# Patient Record
Sex: FEMALE | URBAN - METROPOLITAN AREA
[De-identification: names, ages, dates, MRNs, and addresses within clinical notes are randomized per-mention and may not be internally consistent; named-entity substitution may affect disease eponyms.]

---

## 2019-07-19 ENCOUNTER — APPOINTMENT (RX ONLY)
Dept: URBAN - METROPOLITAN AREA CLINIC 117 | Facility: CLINIC | Age: 54
Setting detail: DERMATOLOGY
End: 2019-07-19

## 2019-07-19 DIAGNOSIS — Z41.1 ENCOUNTER FOR COSMETIC SURGERY: ICD-10-CM

## 2019-07-19 PROCEDURE — ? COSMETIC QUOTE

## 2019-07-19 PROCEDURE — ? CONSULTATION - ABDOMINOPLASTY

## 2019-07-19 PROCEDURE — ? CONSULTATION - LIPOSUCTION

## 2019-07-19 ASSESSMENT — LOCATION SIMPLE DESCRIPTION DERM
LOCATION SIMPLE: RIGHT THIGH
LOCATION SIMPLE: ABDOMEN
LOCATION SIMPLE: LEFT THIGH

## 2019-07-19 ASSESSMENT — LOCATION DETAILED DESCRIPTION DERM
LOCATION DETAILED: RIGHT ANTERIOR PROXIMAL THIGH
LOCATION DETAILED: PERIUMBILICAL SKIN
LOCATION DETAILED: LEFT ANTERIOR LATERAL PROXIMAL THIGH

## 2019-07-19 ASSESSMENT — LOCATION ZONE DERM
LOCATION ZONE: LEG
LOCATION ZONE: TRUNK

## 2019-07-19 NOTE — HPI: ABDOMEN (ABDOMINOPLASTY CONSULTATION)
Is This A New Presentation, Or A Follow-Up?: Abdominal Aesthetic Deformity
How Severe Is It?: mild
Garment: dress size
Weight Measured In Kgs Or Lbs?: lbs
What Is Your Size?: 8
How Much Weight (In Lbs.) Have You Gained?: 7-10
What Is Your Current Weight?: 150lb
Additional History: Patient would like a quote for liposuction as well.  Patient states she had breast reconstruction and would like to discuss fat transfer to her breast.

## 2019-07-19 NOTE — PROCEDURE: COSMETIC QUOTE
Face Procedure 19 Percentage Discount: 0
Misc 3 Free Text Discount (In Dollars- Use Only Numbers And Decimals): 0.00
Breast Procedure 2 Price/Unit (In Dollars- Use Only Numbers And Decimals): [employer]
Facility 1 Price (In Dollars- Use Only Numbers And Decimals): [address] [facility],[address]
Face Procedure 2: Upper/lower Blepharoplasty
Face Procedure 1: Face Lift
Facility Fee Units (Optional): 1
Body Procedure 4: Liposuction of thighs
Body Procedure 2: Abdominoplasty with Liposuction
Detail Level: Zone
Breast Procedure 1: Breast Augmentation-Silicone
Implant 3 Price/Unit (In Dollars- Use Only Numbers And Decimals): [address] [facility]
Facility 1: [facility]
Quote Title (Optional- Will Act As A Header): Liposuction of thigh
Face Procedure 3: Upper Blephroplasty
Implant 4 Price/Unit (In Dollars- Use Only Numbers And Decimals): [address]
Facility 2 Price (In Dollars- Use Only Numbers And Decimals): 600
Facility Fee Amount Override (Use Numbers Only): [address]
Include Sales Tax: No
Body Procedure 3: Liposuction of abdomen and flanks
Implant 1 Price/Unit (In Dollars- Use Only Numbers And Decimals): [address]
Breast Procedure 7: Breast Reduction
Breast Procedure 6: Breast Mastopexy
Body Procedure 1: Abdominoplasty
Facility 2: [facility]
Implant 3 Price/Unit (In Dollars- Use Only Numbers And Decimals): 1870
Facility 2: [facility]
Breast Procedure 2: Breast Augmentation-Saline
Anesthesia Fee Amount Override (Use Numbers Only): [facility]
Anesthesia 1: per hour
Breast Procedure 1 Price/Unit (In Dollars- Use Only Numbers And Decimals): 7898
Breast Procedure 3: Breast Implant Exchange
Implant 1 Price/Unit (In Dollars- Use Only Numbers And Decimals): 550
Implant 4: Silicone cohesive
Body Procedure 4 Price/Unit (In Dollars- Use Only Numbers And Decimals): Ila Brooke
Apply Anesthesia Fee: Use Anesthesia 1
Anesthesia 1 Price/Unit (In Dollars- Use Only Numbers And Decimals): Leah
Apply Facility Fee: Use Facility 1
Body Procedure 4: Liposuction of abdomen
Implant 4 Price/Unit (In Dollars- Use Only Numbers And Decimals): 0933
Implant 1: saline
Implant 3: Silicone soft touch
Breast Procedure 4: Breast Implant exchange with Mastopexy
Implant 2: Silicone inspira
Anesthesia Fee Amount Override (Use Numbers Only): Jorge Hurtado
Face Procedure 4: Lower Blephroplasty
Quote Title (Optional- Will Act As A Header): Abdominoplasty with Liposuction of thighs
Breast Procedure 5: Breast Augmentation with Mastopexy
Facility 1: [facility]
Body Procedure 2: Abdominoplasty with Liposuction to thighs
Implant 2 Price/Unit (In Dollars- Use Only Numbers And Decimals): [address]
Anesthesia 1 Price/Unit (In Dollars- Use Only Numbers And Decimals): 750
Anesthesia Fee Amount Override (Use Numbers Only): Timur Arnett
Implant 2 Price/Unit (In Dollars- Use Only Numbers And Decimals): 2491
Facility Fee Amount Override (Use Numbers Only): 2050
Body Procedure 1 Price/Unit (In Dollars- Use Only Numbers And Decimals): [address] [facility],[address]
Facility Fee Amount Override (Use Numbers Only): [address]
Body Procedure 2 Price/Unit (In Dollars- Use Only Numbers And Decimals): [address]

## 2019-09-03 ENCOUNTER — APPOINTMENT (RX ONLY)
Dept: URBAN - METROPOLITAN AREA CLINIC 117 | Facility: CLINIC | Age: 54
Setting detail: DERMATOLOGY
End: 2019-09-03

## 2019-09-03 DIAGNOSIS — Z85.3 PERSONAL HISTORY OF MALIGNANT NEOPLASM OF BREAST: ICD-10-CM

## 2019-09-03 DIAGNOSIS — Z41.1 ENCOUNTER FOR COSMETIC SURGERY: ICD-10-CM

## 2019-09-03 PROCEDURE — ? CONSULTATION - ABDOMINOPLASTY

## 2019-09-03 PROCEDURE — ? CONSULTATION - LIPOSUCTION

## 2019-09-03 PROCEDURE — ? ADDITIONAL NOTES

## 2019-09-03 PROCEDURE — ? PATIENT SPECIFIC COUNSELING

## 2019-09-03 PROCEDURE — 99212 OFFICE O/P EST SF 10 MIN: CPT

## 2019-09-03 PROCEDURE — ? PRESCRIPTION

## 2019-09-03 RX ORDER — OXYCODONE HYDROCHLORIDE AND ACETAMINOPHEN 10; 325 MG/1; MG/1
TABLET ORAL
Qty: 9 | Refills: 0 | Status: ERX | COMMUNITY
Start: 2019-09-03

## 2019-09-03 RX ORDER — CARISOPRODOL 350 MG/1
TABLET ORAL
Qty: 30 | Refills: 0 | Status: ERX | COMMUNITY
Start: 2019-09-03

## 2019-09-03 RX ADMIN — OXYCODONE HYDROCHLORIDE AND ACETAMINOPHEN: 10; 325 TABLET ORAL at 15:29

## 2019-09-03 RX ADMIN — CARISOPRODOL: 350 TABLET ORAL at 15:29

## 2019-09-03 NOTE — HPI: PREOPERATIVE APPOINTMENT
Has The Patient Completed Informed Consent?: has completed informed consent documentation
Has The Patient Fulfilled Financial Responsibilities For Surgical Scheduling?: is scheduled to complete payment to fulfill financial responsibilities during this visit
Date Of Procedure?: 09/13/19
Facility: [facility]
When Was The Consent Signed?: 09/03/19
Additional History: Patient is waiting for authorization from insurance regarding fat grafting to left breast, this is due to it being a reconstruction revision.

## 2019-09-03 NOTE — PROCEDURE: ADDITIONAL NOTES
Additional Notes: Fat grafting from abdomen and flanks to left breast contour irregularity
Additional Notes: 1. Patient seen preoperatively. \\n2. All patient questions answered. \\n3. ASPS informed Consent(s) reviewed with patient. \\n4. Risks and benefits (including alternative treatments) reviewed. \\n5. Proper prescriptions given.

## 2019-09-16 ENCOUNTER — APPOINTMENT (RX ONLY)
Dept: URBAN - METROPOLITAN AREA CLINIC 117 | Facility: CLINIC | Age: 54
Setting detail: DERMATOLOGY
End: 2019-09-16

## 2019-09-16 DIAGNOSIS — Z48.89 ENCOUNTER FOR OTHER SPECIFIED SURGICAL AFTERCARE: ICD-10-CM

## 2019-09-16 PROCEDURE — ? COUNSELING - POST-OP CHECK, LIPOSUCTION

## 2019-09-16 PROCEDURE — ? COUNSELING - POST-OP CHECK

## 2019-09-16 PROCEDURE — ? COUNSELING - POST-OP CHECK, ABDOMINOPLASTY

## 2019-09-16 ASSESSMENT — LOCATION SIMPLE DESCRIPTION DERM
LOCATION SIMPLE: LEFT BREAST
LOCATION SIMPLE: LEFT THIGH
LOCATION SIMPLE: RIGHT THIGH

## 2019-09-16 ASSESSMENT — LOCATION DETAILED DESCRIPTION DERM
LOCATION DETAILED: LEFT LATERAL BREAST 12-1:00 REGION
LOCATION DETAILED: RIGHT ANTERIOR LATERAL PROXIMAL THIGH
LOCATION DETAILED: LEFT ANTERIOR LATERAL PROXIMAL THIGH

## 2019-09-16 ASSESSMENT — LOCATION ZONE DERM
LOCATION ZONE: TRUNK
LOCATION ZONE: LEG

## 2019-09-16 NOTE — HPI: POST-OP CHECK, BREAST RECONSTRUCTION (EXTENDED)
Where Is Your Surgical Site To Be Checked?: the left breast
What Best Describes The Level Of Symmetry? (Check All That Apply): good
How Severe Is Your Pain?: 4 out of 10
How Is Your Wound Healing?: fresh
Compared To The Last Evaluation, How Have The Findings Changed?: improved
What Is Your Overall Satisfaction Level With The Left Breast?: satisfied
Date Of Procedure: 09/13/19

## 2019-09-23 ENCOUNTER — APPOINTMENT (RX ONLY)
Dept: URBAN - METROPOLITAN AREA CLINIC 117 | Facility: CLINIC | Age: 54
Setting detail: DERMATOLOGY
End: 2019-09-23

## 2019-09-23 DIAGNOSIS — Z48.89 ENCOUNTER FOR OTHER SPECIFIED SURGICAL AFTERCARE: ICD-10-CM

## 2019-09-23 PROCEDURE — ? COUNSELING - POST-OP CHECK, ABDOMINOPLASTY

## 2019-09-23 PROCEDURE — ? COUNSELING - POST-OP CHECK

## 2019-09-23 PROCEDURE — ? COUNSELING - POST-OP CHECK, LIPOSUCTION

## 2019-09-23 ASSESSMENT — LOCATION SIMPLE DESCRIPTION DERM
LOCATION SIMPLE: RIGHT THIGH
LOCATION SIMPLE: LEFT THIGH
LOCATION SIMPLE: LEFT BREAST

## 2019-09-23 ASSESSMENT — LOCATION ZONE DERM
LOCATION ZONE: TRUNK
LOCATION ZONE: LEG

## 2019-09-23 ASSESSMENT — LOCATION DETAILED DESCRIPTION DERM
LOCATION DETAILED: RIGHT ANTERIOR LATERAL PROXIMAL THIGH
LOCATION DETAILED: LEFT ANTERIOR LATERAL PROXIMAL THIGH
LOCATION DETAILED: LEFT LATERAL BREAST 12-1:00 REGION

## 2019-09-23 NOTE — HPI: POST-OP CHECK, LIPOSUCTION
History: Patient is also following up from liposuction to abdomen and outer thighs, she has no concerning symptoms and feels symmetry is currently good.

## 2019-09-23 NOTE — HPI: POST-OP CHECK, ABDOMINOPLASTY (EXTENDED)
What Best Describes The Level Of Symmetry? (Check All That Apply): good
How Severe Is Your Pain?: mild
How Is Your Wound Healing?: healing well
Compared To The Last Evaluation, How Have The Findings Changed?: improved
What Is Your Overall Satisfaction Level With The Current Outcome?: satisfied

## 2019-09-23 NOTE — HPI: POST-OP CHECK, BREAST RECONSTRUCTION (EXTENDED)
Where Is Your Surgical Site To Be Checked?: the left breast
What Best Describes The Level Of Symmetry? (Check All That Apply): good
How Severe Is Your Pain?: mild
How Is Your Wound Healing?: healing well
Compared To The Last Evaluation, How Have The Findings Changed?: improved
What Is Your Overall Satisfaction Level With The Left Breast?: satisfied

## 2019-10-01 ENCOUNTER — APPOINTMENT (RX ONLY)
Dept: URBAN - METROPOLITAN AREA CLINIC 117 | Facility: CLINIC | Age: 54
Setting detail: DERMATOLOGY
End: 2019-10-01

## 2019-10-01 DIAGNOSIS — Z48.89 ENCOUNTER FOR OTHER SPECIFIED SURGICAL AFTERCARE: ICD-10-CM

## 2019-10-01 PROCEDURE — 99024 POSTOP FOLLOW-UP VISIT: CPT

## 2019-10-01 PROCEDURE — ? COUNSELING - POST-OP CHECK, LIPOSUCTION

## 2019-10-01 PROCEDURE — ? COUNSELING - POST-OP CHECK, BREAST RECONSTRUCTION, EXPANDER/IMPLANT

## 2019-10-01 PROCEDURE — ? COUNSELING - POST-OP CHECK

## 2019-10-01 PROCEDURE — ? COUNSELING - POST-OP CHECK, ABDOMINOPLASTY

## 2019-10-01 ASSESSMENT — LOCATION DETAILED DESCRIPTION DERM
LOCATION DETAILED: LEFT LATERAL BREAST 12-1:00 REGION
LOCATION DETAILED: LEFT ANTERIOR LATERAL PROXIMAL THIGH
LOCATION DETAILED: RIGHT MEDIAL BREAST 5-6:00 REGION
LOCATION DETAILED: RIGHT ANTERIOR LATERAL PROXIMAL THIGH

## 2019-10-01 ASSESSMENT — LOCATION SIMPLE DESCRIPTION DERM
LOCATION SIMPLE: LEFT THIGH
LOCATION SIMPLE: RIGHT BREAST
LOCATION SIMPLE: LEFT BREAST
LOCATION SIMPLE: RIGHT THIGH

## 2019-10-01 ASSESSMENT — LOCATION ZONE DERM
LOCATION ZONE: TRUNK
LOCATION ZONE: LEG
LOCATION ZONE: TRUNK

## 2019-10-01 NOTE — HPI: POST-OP CHECK, BREAST RECONSTRUCTION (EXTENDED)
Where Is Your Surgical Site To Be Checked?: the left breast
What Best Describes The Level Of Symmetry? (Check All That Apply): good
How Severe Is Your Pain?: 4 out of 10
How Is Your Wound Healing?: healing well
Compared To The Last Evaluation, How Have The Findings Changed?: improved
Date Of Procedure: 9/13/19

## 2019-10-15 ENCOUNTER — APPOINTMENT (RX ONLY)
Dept: URBAN - METROPOLITAN AREA CLINIC 117 | Facility: CLINIC | Age: 54
Setting detail: DERMATOLOGY
End: 2019-10-15

## 2019-10-15 DIAGNOSIS — Z48.89 ENCOUNTER FOR OTHER SPECIFIED SURGICAL AFTERCARE: ICD-10-CM

## 2019-10-15 PROBLEM — Z85.3 PERSONAL HISTORY OF MALIGNANT NEOPLASM OF BREAST: Status: ACTIVE | Noted: 2019-10-15

## 2019-10-15 PROCEDURE — ? COUNSELING - POST-OP CHECK, ABDOMINOPLASTY

## 2019-10-15 PROCEDURE — ? COUNSELING - POST-OP CHECK, BREAST RECONSTRUCTION, EXPANDER/IMPLANT

## 2019-10-15 PROCEDURE — ? COUNSELING - POST-OP CHECK, LIPOSUCTION

## 2019-10-15 PROCEDURE — ? COUNSELING - POST-OP CHECK

## 2019-10-15 PROCEDURE — 99024 POSTOP FOLLOW-UP VISIT: CPT

## 2019-10-15 ASSESSMENT — LOCATION ZONE DERM
LOCATION ZONE: LEG
LOCATION ZONE: TRUNK

## 2019-10-15 ASSESSMENT — LOCATION SIMPLE DESCRIPTION DERM
LOCATION SIMPLE: LEFT THIGH
LOCATION SIMPLE: LEFT BREAST
LOCATION SIMPLE: RIGHT THIGH

## 2019-10-15 ASSESSMENT — LOCATION DETAILED DESCRIPTION DERM
LOCATION DETAILED: LEFT ANTERIOR LATERAL PROXIMAL THIGH
LOCATION DETAILED: RIGHT ANTERIOR LATERAL PROXIMAL THIGH
LOCATION DETAILED: LEFT LATERAL BREAST 12-1:00 REGION

## 2019-10-15 NOTE — HPI: POST-OP CHECK, BREAST RECONSTRUCTION (EXTENDED)
Where Is Your Surgical Site To Be Checked?: both breasts
How Severe Is Your Pain?: 1 out of 10
How Is Your Wound Healing?: healing well
Compared To The Last Evaluation, How Have The Findings Changed?: improved
What Is Your Overall Satisfaction Level With The Right Breast?: satisfied
Date Of Procedure: 9/13/19

## 2019-10-29 ENCOUNTER — APPOINTMENT (RX ONLY)
Dept: URBAN - METROPOLITAN AREA CLINIC 117 | Facility: CLINIC | Age: 54
Setting detail: DERMATOLOGY
End: 2019-10-29

## 2019-10-29 DIAGNOSIS — Z48.89 ENCOUNTER FOR OTHER SPECIFIED SURGICAL AFTERCARE: ICD-10-CM

## 2019-10-29 PROCEDURE — ? COUNSELING - POST-OP CHECK, ABDOMINOPLASTY

## 2019-10-29 PROCEDURE — ? COUNSELING - POST-OP CHECK, BREAST RECONSTRUCTION, EXPANDER/IMPLANT

## 2019-10-29 PROCEDURE — 99024 POSTOP FOLLOW-UP VISIT: CPT

## 2019-10-29 PROCEDURE — ? COUNSELING - POST-OP CHECK, LIPOSUCTION

## 2019-10-29 PROCEDURE — ? COUNSELING - POST-OP CHECK

## 2019-10-29 ASSESSMENT — LOCATION ZONE DERM
LOCATION ZONE: TRUNK
LOCATION ZONE: LEG

## 2019-10-29 ASSESSMENT — LOCATION SIMPLE DESCRIPTION DERM
LOCATION SIMPLE: RIGHT THIGH
LOCATION SIMPLE: LEFT BREAST
LOCATION SIMPLE: LEFT THIGH

## 2019-10-29 NOTE — HPI: POST-OP CHECK, BREAST RECONSTRUCTION (EXTENDED)
Where Is Your Surgical Site To Be Checked?: the left breast
What Best Describes The Level Of Symmetry? (Check All That Apply): good
How Severe Is Your Pain?: 1 out of 10
How Is Your Wound Healing?: healing well
Compared To The Last Evaluation, How Have The Findings Changed?: improved
What Is Your Overall Satisfaction Level With The Right Breast?: satisfied
Date Of Procedure: 9/13/19

## 2019-10-29 NOTE — PROCEDURE: COUNSELING - POST-OP CHECK, BREAST RECONSTRUCTION, EXPANDER/IMPLANT
Detail Level: Simple
Add Postop Global No-Charge Code (07299)?: yes
Add Postop Global No-Charge Code (24922)?: no

## 2020-01-06 ENCOUNTER — APPOINTMENT (RX ONLY)
Dept: URBAN - METROPOLITAN AREA CLINIC 117 | Facility: CLINIC | Age: 55
Setting detail: DERMATOLOGY
End: 2020-01-06

## 2020-01-06 DIAGNOSIS — Z48.89 ENCOUNTER FOR OTHER SPECIFIED SURGICAL AFTERCARE: ICD-10-CM

## 2020-01-06 PROCEDURE — ? COUNSELING - POST-OP CHECK, BREAST RECONSTRUCTION, EXPANDER/IMPLANT

## 2020-01-06 PROCEDURE — ? COUNSELING - POST-OP CHECK, ABDOMINOPLASTY

## 2020-01-06 PROCEDURE — 99024 POSTOP FOLLOW-UP VISIT: CPT

## 2020-01-06 PROCEDURE — ? COUNSELING - POST-OP CHECK

## 2020-01-06 PROCEDURE — ? COUNSELING - POST-OP CHECK, LIPOSUCTION

## 2020-01-06 ASSESSMENT — LOCATION SIMPLE DESCRIPTION DERM
LOCATION SIMPLE: RIGHT BREAST
LOCATION SIMPLE: LEFT BREAST
LOCATION SIMPLE: LEFT THIGH
LOCATION SIMPLE: LEFT BREAST
LOCATION SIMPLE: RIGHT THIGH

## 2020-01-06 ASSESSMENT — LOCATION DETAILED DESCRIPTION DERM
LOCATION DETAILED: LEFT ANTERIOR LATERAL PROXIMAL THIGH
LOCATION DETAILED: LEFT LATERAL BREAST 12-1:00 REGION
LOCATION DETAILED: RIGHT MEDIAL BREAST 5-6:00 REGION
LOCATION DETAILED: LEFT MEDIAL BREAST 10-11:00 REGION
LOCATION DETAILED: RIGHT ANTERIOR LATERAL PROXIMAL THIGH

## 2020-01-06 ASSESSMENT — LOCATION ZONE DERM
LOCATION ZONE: TRUNK
LOCATION ZONE: TRUNK
LOCATION ZONE: LEG

## 2020-01-06 NOTE — HPI: POST-OP CHECK, BREAST RECONSTRUCTION (EXTENDED)
Where Is Your Surgical Site To Be Checked?: both breasts
What Best Describes The Level Of Symmetry? (Check All That Apply): good
How Severe Is Your Pain?: 2 out of 10
How Is Your Wound Healing?: healing well
Compared To The Last Evaluation, How Have The Findings Changed?: improved
Date Of Procedure: 9/13/19

## 2020-02-04 NOTE — HPI: POST-OP CHECK, ABDOMINOPLASTY (EXTENDED)
What Best Describes The Level Of Symmetry? (Check All That Apply): good
[FreeTextEntry1] : Patient evidenced acute pharyngitis. Was swollen area in the lymph node will empirically treated with amoxicillin 500 t.i.d. x7
How Severe Is Your Pain?: 5 out of 10
How Is Your Wound Healing?: healing well
Compared To The Last Evaluation, How Have The Findings Changed?: stable
What Is Your Overall Satisfaction Level With The Current Outcome?: satisfied
Date Of Procedure: 09/13/19